# Patient Record
Sex: MALE | Race: WHITE | HISPANIC OR LATINO | URBAN - METROPOLITAN AREA
[De-identification: names, ages, dates, MRNs, and addresses within clinical notes are randomized per-mention and may not be internally consistent; named-entity substitution may affect disease eponyms.]

---

## 2023-06-18 ENCOUNTER — HOSPITAL ENCOUNTER (EMERGENCY)
Facility: MEDICAL CENTER | Age: 23
End: 2023-06-18
Attending: STUDENT IN AN ORGANIZED HEALTH CARE EDUCATION/TRAINING PROGRAM
Payer: COMMERCIAL

## 2023-06-18 VITALS
HEIGHT: 65 IN | BODY MASS INDEX: 25.83 KG/M2 | TEMPERATURE: 99.1 F | WEIGHT: 155 LBS | HEART RATE: 75 BPM | OXYGEN SATURATION: 98 % | RESPIRATION RATE: 16 BRPM | SYSTOLIC BLOOD PRESSURE: 135 MMHG | DIASTOLIC BLOOD PRESSURE: 76 MMHG

## 2023-06-18 DIAGNOSIS — S61.412A LACERATION OF LEFT PALM, INITIAL ENCOUNTER: ICD-10-CM

## 2023-06-18 DIAGNOSIS — S61.213A LACERATION OF LEFT MIDDLE FINGER WITHOUT FOREIGN BODY WITHOUT DAMAGE TO NAIL, INITIAL ENCOUNTER: ICD-10-CM

## 2023-06-18 PROCEDURE — 99282 EMERGENCY DEPT VISIT SF MDM: CPT

## 2023-06-18 PROCEDURE — 90715 TDAP VACCINE 7 YRS/> IM: CPT | Performed by: STUDENT IN AN ORGANIZED HEALTH CARE EDUCATION/TRAINING PROGRAM

## 2023-06-18 PROCEDURE — 700111 HCHG RX REV CODE 636 W/ 250 OVERRIDE (IP): Performed by: STUDENT IN AN ORGANIZED HEALTH CARE EDUCATION/TRAINING PROGRAM

## 2023-06-18 PROCEDURE — 304217 HCHG IRRIGATION SYSTEM

## 2023-06-18 PROCEDURE — 90471 IMMUNIZATION ADMIN: CPT

## 2023-06-18 PROCEDURE — 304999 HCHG REPAIR-SIMPLE/INTERMED LEVEL 1

## 2023-06-18 PROCEDURE — 303747 HCHG EXTRA SUTURE

## 2023-06-18 RX ADMIN — CLOSTRIDIUM TETANI TOXOID ANTIGEN (FORMALDEHYDE INACTIVATED), CORYNEBACTERIUM DIPHTHERIAE TOXOID ANTIGEN (FORMALDEHYDE INACTIVATED), BORDETELLA PERTUSSIS TOXOID ANTIGEN (GLUTARALDEHYDE INACTIVATED), BORDETELLA PERTUSSIS FILAMENTOUS HEMAGGLUTININ ANTIGEN (FORMALDEHYDE INACTIVATED), BORDETELLA PERTUSSIS PERTACTIN ANTIGEN, AND BORDETELLA PERTUSSIS FIMBRIAE 2/3 ANTIGEN 0.5 ML: 5; 2; 2.5; 5; 3; 5 INJECTION, SUSPENSION INTRAMUSCULAR at 20:31

## 2023-06-18 RX ADMIN — LIDOCAINE HYDROCHLORIDE 10 ML: 10 INJECTION, SOLUTION EPIDURAL; INFILTRATION; INTRACAUDAL at 20:30

## 2023-06-19 NOTE — ED PROVIDER NOTES
"ED Provider Note    CHIEF COMPLAINT  Chief Complaint   Patient presents with    Laceration     PT WITH X2 LACERATION TO LEFT HAND. PT WITH PALM LACERATION ~ 3 IN AND LEFT 3RD DIGIT LACERATION. PT WAS OPENING A CAN OF BEANS WITH A KNIFE AND CUT HIS HAND. NOT CURRENT ON TETANUS. NO ACTIVE BLEEDING IN TRIAGE. WRAPPED WITH STERILE SOAKED GAUZE IN TRIAGE.        HPI/ROS  LIMITATION TO HISTORY   Select: : None  OUTSIDE HISTORIAN(S):      Jona Jasso is a 22 y.o. male who presents with laceration to the left hand x2.  Patient has a laceration at the base of the left thenar eminence and tip of the third digit distal to the DIP on the palmar surface.  Patient reports that he was opening a can of beans when this occurred.  Patient denies recent tetanus.  Patient denies numbness or weakness in the affected digits.  Patient denies any other injury.    PAST MEDICAL HISTORY   has a past medical history of Patient denies medical problems.    SURGICAL HISTORY  patient denies any surgical history    FAMILY HISTORY  History reviewed. No pertinent family history.    SOCIAL HISTORY  Social History     Tobacco Use    Smoking status: Never    Smokeless tobacco: Never   Vaping Use    Vaping Use: Some days    Substances: THC   Substance and Sexual Activity    Alcohol use: Not Currently    Drug use: Yes     Types: Inhaled     Comment: MARIJUANA    Sexual activity: Not on file       CURRENT MEDICATIONS  Home Medications       Reviewed by Renee Peterson R.N. (Registered Nurse) on 06/18/23 at 1949  Med List Status: Not Addressed     Medication Last Dose Status        Patient Ian Taking any Medications                           ALLERGIES  No Known Allergies    PHYSICAL EXAM  VITAL SIGNS: /80   Pulse 66   Temp 37.3 °C (99.1 °F) (Temporal)   Resp 16   Ht 1.651 m (5' 5\")   Wt 70.3 kg (155 lb)   SpO2 99%   BMI 25.79 kg/m²    Physical Exam  Vitals and nursing note reviewed.   Constitutional:       Comments: Patient " is lying in bed supine, pleasant, conversant, speaking in complete sentences   HENT:      Head: Normocephalic and atraumatic.   Eyes:      Extraocular Movements: Extraocular movements intact.      Conjunctiva/sclera: Conjunctivae normal.      Pupils: Pupils are equal, round, and reactive to light.   Cardiovascular:      Pulses: Normal pulses.      Comments: HR 66  Pulmonary:      Effort: Pulmonary effort is normal. No respiratory distress.   Musculoskeletal:         General: No swelling. Normal range of motion.        Hands:       Cervical back: Normal range of motion. No rigidity.      Comments: Full-thickness lacerations.  The third digit and thenar eminence.  No evidence of tendon rupture.  Flexion/extension intact at the DIP of the third digit, DIP of the first digit, flexion/extension/abduction/abduction of the MCP of the first digit.  Sensation light touch grossly intact in the affected digits.  Warm and well-perfused, cap refill less than 5 seconds.   Skin:     General: Skin is warm and dry.      Capillary Refill: Capillary refill takes less than 2 seconds.   Neurological:      Mental Status: He is alert.           COURSE & MEDICAL DECISION MAKING      INITIAL ASSESSMENT, COURSE AND PLAN  Care Narrative: No evidence of tendon rupture.  No evidence of neurovascular compromise.  No evidence of compartment syndrome.  No evidence of infection.  No raw meat was exposed to the patient's wound.  Patient's wound was irrigated aggressively.  No evidence of bony tenderness or fracture or dislocation.  Disposition pending laceration repair.    Electronically signed by: Chris Ryan M.D., 6/18/2023 8:43 PM    Laceration repair without complication.  Dressing has been placed.  Patient counseled to return for any weakness, decreased range of motion, numbness, signs of infection.  Patient counseled to follow-up in 1 week for suture removal.    Repeat physical exam benign.  I doubt any serious emergency process at  this time.  Patient and/or family, friends given strict return precautions for worsening symptoms and care instructions. They have demonstrated understanding of discharge instructions through teach back mechanism. Advised PCP follow-up in 1-2 days.  Patient/family/friend expresses understanding and agrees to plan.    This dictation has been created using voice recognition software. I am continuously working with the software to minimize the number of voice recognition errors and I have made every attempt to manually correct the errors within my dictation. However errors  related to this voice recognition software may still exist and should be interpreted within the appropriate context.     Electronically signed by: Chris Ryan M.D., 6/18/2023 9:22 PM      LACERATION REPAIR PROCEDURE NOTE  The patient's identification was confirmed and consent was obtained.  This procedure was performed by Dr. Ryan at 9:15 PM.  Site: Finger and palm  Sterile procedures observed  Anesthetic used (type and amt): 8 cc 1% lidocaine without epinephrine  Suture type/size: 4-0 Ethilon  Length: 5 cm  # of Sutures: 15  Technique:: Simple interrupted  Complexity: Complex  Antibx ointment applied  Tetanus given  Site anesthetized, irrigated with NS, explored without evidence of foreign body, wound well approximated, site covered with dry, sterile dressing. Patient tolerated procedure well without complications. Instructions for care discussed verbally and patient provided with additional written instructions for homecare and f/u.        FINAL DIAGNOSIS  1. Laceration of left middle finger without foreign body without damage to nail, initial encounter    2. Laceration of left palm, initial encounter           Electronically signed by: Chris Ryan M.D., 6/18/2023 8:36 PM

## 2023-06-19 NOTE — ED NOTES
Wound cleaned and abx ointment and dressing applied by ED Tech. Discharge education provided. Discharge paperwork signed by pt. All questions answered. All belongings with pt. Pt ambulated to lobby unassisted with steady gait.

## 2023-06-19 NOTE — ED TRIAGE NOTES
"Chief Complaint   Patient presents with    Laceration     PT WITH X2 LACERATION TO LEFT HAND. PT WITH PALM LACERATION ~ 3 IN AND LEFT 3RD DIGIT LACERATION. PT WAS OPENING A CAN OF BEANS WITH A KNIFE AND CUT HIS HAND. NOT CURRENT ON TETANUS. NO ACTIVE BLEEDING IN TRIAGE. WRAPPED WITH STERILE SOAKED GAUZE IN TRIAGE.            PT AMBULATORY TO TRIAGE. Pt AA&O X 4, SEE ABOVE .     PT TO LOBBY . PT EDUCATED ON ALERTING STAFF TO CHANGES IN CONDITION. PT VERBALIZED AN UNDERSTANDING.     /77   Pulse 66   Temp 36.6 °C (97.9 °F) (Temporal)   Resp 14   Ht 1.651 m (5' 5\")   Wt 70.3 kg (155 lb)   SpO2 98%   BMI 25.79 kg/m²     "

## 2023-06-26 ENCOUNTER — HOSPITAL ENCOUNTER (EMERGENCY)
Facility: MEDICAL CENTER | Age: 23
End: 2023-06-26
Payer: COMMERCIAL

## 2023-06-26 VITALS
DIASTOLIC BLOOD PRESSURE: 68 MMHG | HEIGHT: 65 IN | OXYGEN SATURATION: 97 % | TEMPERATURE: 98.6 F | BODY MASS INDEX: 25.83 KG/M2 | WEIGHT: 155 LBS | SYSTOLIC BLOOD PRESSURE: 110 MMHG | RESPIRATION RATE: 18 BRPM | HEART RATE: 93 BPM

## 2023-06-26 PROCEDURE — 15853 REMOVAL SUTR/STAPL XREQ ANES: CPT

## 2023-06-26 PROCEDURE — 99281 EMR DPT VST MAYX REQ PHY/QHP: CPT | Mod: EDC

## 2023-06-27 NOTE — ED TRIAGE NOTES
"Chief Complaint   Patient presents with    Suture Removal     Pt had sutures placed to left hand 7 days.   Denies fevers, chills or drainage.        23 yo M to triage for above complaint.      Sutures removed by triage RN, pt educated on signs of infection and when to come back to ED. All questions answered .    /68   Pulse 93   Temp 37 °C (98.6 °F) (Temporal)   Resp 18   Ht 1.651 m (5' 5\")   Wt 70.3 kg (155 lb)   SpO2 97%   BMI 25.79 kg/m²     "